# Patient Record
Sex: MALE | Race: OTHER | NOT HISPANIC OR LATINO | Employment: OTHER | URBAN - METROPOLITAN AREA
[De-identification: names, ages, dates, MRNs, and addresses within clinical notes are randomized per-mention and may not be internally consistent; named-entity substitution may affect disease eponyms.]

---

## 2022-04-19 ENCOUNTER — OFFICE VISIT (OUTPATIENT)
Dept: URGENT CARE | Facility: CLINIC | Age: 42
End: 2022-04-19

## 2022-04-19 VITALS
OXYGEN SATURATION: 100 % | BODY MASS INDEX: 22.81 KG/M2 | RESPIRATION RATE: 16 BRPM | WEIGHT: 154 LBS | DIASTOLIC BLOOD PRESSURE: 98 MMHG | HEIGHT: 69 IN | SYSTOLIC BLOOD PRESSURE: 130 MMHG | HEART RATE: 80 BPM | TEMPERATURE: 99.3 F

## 2022-04-19 DIAGNOSIS — K04.7 DENTAL ABSCESS: ICD-10-CM

## 2022-04-19 PROCEDURE — 99203 OFFICE O/P NEW LOW 30 MIN: CPT | Performed by: NURSE PRACTITIONER

## 2022-04-19 RX ORDER — AMOXICILLIN AND CLAVULANATE POTASSIUM 875; 125 MG/1; MG/1
1 TABLET, FILM COATED ORAL 2 TIMES DAILY
Qty: 20 TABLET | Refills: 0 | Status: SHIPPED | OUTPATIENT
Start: 2022-04-19 | End: 2022-04-29

## 2022-04-19 ASSESSMENT — ENCOUNTER SYMPTOMS
CHILLS: 0
FEVER: 0

## 2022-04-20 NOTE — PROGRESS NOTES
"Subjective     Serafin Reveles is a 41 y.o. male who presents with Dental Pain (X2days, possible infection, otc meds not helping )    No past medical history on file.  Social History     Socioeconomic History   • Marital status: Single     Spouse name: Not on file   • Number of children: Not on file   • Years of education: Not on file   • Highest education level: Not on file   Occupational History   • Not on file   Tobacco Use   • Smoking status: Current Every Day Smoker   • Smokeless tobacco: Never Used   Substance and Sexual Activity   • Alcohol use: Not on file   • Drug use: Not on file   • Sexual activity: Not on file   Other Topics Concern   • Not on file   Social History Narrative   • Not on file     Social Determinants of Health     Financial Resource Strain: Not on file   Food Insecurity: Not on file   Transportation Needs: Not on file   Physical Activity: Not on file   Stress: Not on file   Social Connections: Not on file   Intimate Partner Violence: Not on file   Housing Stability: Not on file     No family history on file.    Allergies: Patient has no known allergies.    Patient is a 41-year-old male who presents today with complaint of dental pain.  Symptoms started over the last 2 days.  He recounts having poor dental hygiene and dentition and states he does plan to follow-up with a dentist.  Has been in the [place] area for the last 2 days.        Dental Pain   This is a new problem. The current episode started in the past 7 days. The problem occurs constantly. The problem has been unchanged. The pain is moderate. Pertinent negatives include no fever. He has tried nothing for the symptoms. The treatment provided no relief.       Review of Systems   Constitutional: Negative for chills and fever.   HENT:        Mouth pain   All other systems reviewed and are negative.             Objective     /98   Pulse 80   Temp 37.4 °C (99.3 °F) (Temporal)   Resp 16   Ht 1.753 m (5' 9\")   Wt 69.9 kg (154 lb)  "  SpO2 100%   BMI 22.74 kg/m²      Physical Exam  Vitals reviewed.   Constitutional:       Appearance: Normal appearance.   HENT:      Mouth/Throat:      Mouth: Mucous membranes are moist.      Pharynx: No oropharyngeal exudate or posterior oropharyngeal erythema.   Neurological:      Mental Status: He is alert.     Both incisors appear to be broken down below the gumline and area is red and erythematous.  No purulent drainage.                        Assessment & Plan   Mouth pain  Dental abscess    Augmentin  Warm salt water gargles  Tylenol/Motrin  Follow-up with dentist  May return to urgent care otherwise for any further questions or concerns     There are no diagnoses linked to this encounter.